# Patient Record
Sex: MALE | Race: BLACK OR AFRICAN AMERICAN | Employment: UNEMPLOYED | URBAN - NONMETROPOLITAN AREA
[De-identification: names, ages, dates, MRNs, and addresses within clinical notes are randomized per-mention and may not be internally consistent; named-entity substitution may affect disease eponyms.]

---

## 2023-06-19 ENCOUNTER — HOSPITAL ENCOUNTER (EMERGENCY)
Age: 28
Discharge: HOME | End: 2023-06-19
Attending: FAMILY MEDICINE

## 2023-06-19 VITALS
TEMPERATURE: 97.1 F | OXYGEN SATURATION: 99 % | HEART RATE: 72 BPM | RESPIRATION RATE: 16 BRPM | WEIGHT: 180 LBS | BODY MASS INDEX: 28.25 KG/M2 | SYSTOLIC BLOOD PRESSURE: 137 MMHG | HEIGHT: 67 IN | DIASTOLIC BLOOD PRESSURE: 97 MMHG

## 2023-06-19 DIAGNOSIS — R30.0 DYSURIA: Primary | ICD-10-CM

## 2023-06-19 LAB
APPEARANCE UR: CLEAR
BACTERIA URNS QL MICRO: ABNORMAL /HPF
BILIRUB UR QL: NEGATIVE
COLOR UR: YELLOW
EPITH CASTS URNS QL MICRO: ABNORMAL /LPF (ref 0–20)
GLUCOSE UR STRIP.AUTO-MCNC: NEGATIVE MG/DL
HGB UR QL STRIP: NEGATIVE
KETONES UR QL STRIP.AUTO: NEGATIVE MG/DL
LEUKOCYTE ESTERASE UR QL STRIP.AUTO: ABNORMAL
NITRITE UR QL STRIP.AUTO: NEGATIVE
PH UR STRIP: 6 (ref 5–8)
PROT UR STRIP-MCNC: NEGATIVE MG/DL
RBC #/AREA URNS HPF: ABNORMAL /HPF (ref 0–2)
SP GR UR REFRACTOMETRY: 1.02 (ref 1–1.03)
UROBILINOGEN UR QL STRIP.AUTO: 1 EU/DL (ref 0.2–1)
WBC URNS QL MICRO: ABNORMAL /HPF (ref 0–4)

## 2023-06-19 PROCEDURE — 99283 EMERGENCY DEPT VISIT LOW MDM: CPT

## 2023-06-19 PROCEDURE — 87491 CHLMYD TRACH DNA AMP PROBE: CPT

## 2023-06-19 PROCEDURE — 87086 URINE CULTURE/COLONY COUNT: CPT

## 2023-06-19 PROCEDURE — 81001 URINALYSIS AUTO W/SCOPE: CPT

## 2023-06-19 PROCEDURE — 87591 N.GONORRHOEAE DNA AMP PROB: CPT

## 2023-06-19 RX ORDER — SULFAMETHOXAZOLE AND TRIMETHOPRIM 800; 160 MG/1; MG/1
1 TABLET ORAL 2 TIMES DAILY
Qty: 6 TABLET | Refills: 0 | Status: SHIPPED | OUTPATIENT
Start: 2023-06-19 | End: 2023-06-22

## 2023-06-19 ASSESSMENT — PAIN SCALES - GENERAL: PAINLEVEL_OUTOF10: 0

## 2023-06-19 ASSESSMENT — PAIN - FUNCTIONAL ASSESSMENT: PAIN_FUNCTIONAL_ASSESSMENT: 0-10

## 2023-06-19 NOTE — ED PROVIDER NOTES
[facility] EMERGENCY DEPT  EMERGENCY DEPARTMENT ENCOUNTER      Pt Name: Johnny Loomis  MRN: [id number]  Xeniagfaj 1995  Date of evaluation: 6/19/2023  Provider: Radha Santos DO    CHIEF COMPLAINT       Chief Complaint   Patient presents with    Sexually Transmitted Diseases    Knee Pain         HISTORY OF PRESENT ILLNESS   (Location/Symptom, Timing/Onset, Context/Setting, Quality, Duration, Modifying Factors, Severity)  Note limiting factors. Johnny Loomis is a 24 y.o. male who presents to the emergency department patient comes in stating on Thursday he had some unprotected sex with a female he does not really know states has been having some burning with urination. He has had chlamydia in the past has not taken anything for it he states this is different than his last STD. He is not sticking to his underwear. Not having any drainage is urinating pain. Denies any anal sex. Denies any knee pain for me. HPI    Nursing Notes were reviewed. REVIEW OF SYSTEMS    (2-9 systems for level 4, 10 or more for level 5)     Review of Systems   Genitourinary:  Positive for dysuria. All other systems reviewed and are negative. Except as noted above the remainder of the review of systems was reviewed and negative. PAST MEDICAL HISTORY   No past medical history on file. SURGICAL HISTORY     No past surgical history on file. CURRENT MEDICATIONS       Previous Medications    No medications on file       ALLERGIES     Patient has no known allergies. FAMILY HISTORY     No family history on file.        SOCIAL HISTORY       Social History     Socioeconomic History    Marital status: Single       SCREENINGS         Mercedes Coma Scale  Eye Opening: Spontaneous  Best Verbal Response: Oriented  Best Motor Response: Obeys commands  Mercedes Coma Scale Score: 15                     CIWA Assessment  BP: (!) 137/97  Pulse: 72                 PHYSICAL EXAM    (up to 7 for level 4, 8 or more for level 3058

## 2023-06-19 NOTE — DISCHARGE INSTRUCTIONS
As we spoke, I will send urine off for culture, your gonorrhea and chlamydia is also been sent off. It sounds more like a urinary tract infection. Pain with urination increased frequency. I do not see any signs of an STD. However if your gonorrhea and chlamydia comes back at positive in the next 24 to 48 hours somebody will call you with appropriate treatment.   My recommendation is to stay in from any intercourse or sexual activity until confirmed that no STD exist

## 2023-06-20 LAB
BACTERIA SPEC CULT: ABNORMAL
COLONY COUNT, CNT: 2000
COLONY COUNT, CNT: ABNORMAL
Lab: ABNORMAL

## 2023-06-20 NOTE — ED NOTES
Pt called requesting information on his lab results.  I informed pt that the result of his STI test has not come back yet and we will call if it comes back positive      Nickolas Anguiano  06/20/23 6820

## 2024-01-10 ENCOUNTER — HOSPITAL ENCOUNTER (OUTPATIENT)
Age: 29
Discharge: HOME | End: 2024-01-13

## 2024-07-08 ENCOUNTER — HOSPITAL ENCOUNTER (EMERGENCY)
Age: 29
Discharge: HOME | End: 2024-07-08
Attending: EMERGENCY MEDICINE
Payer: COMMERCIAL

## 2024-07-08 VITALS
WEIGHT: 160 LBS | DIASTOLIC BLOOD PRESSURE: 79 MMHG | BODY MASS INDEX: 25.11 KG/M2 | HEIGHT: 67 IN | OXYGEN SATURATION: 98 % | TEMPERATURE: 98.4 F | SYSTOLIC BLOOD PRESSURE: 125 MMHG | HEART RATE: 86 BPM | RESPIRATION RATE: 16 BRPM

## 2024-07-08 DIAGNOSIS — T16.1XXA FOREIGN BODY IN RIGHT EAR, INITIAL ENCOUNTER: Primary | ICD-10-CM

## 2024-07-08 PROCEDURE — 99282 EMERGENCY DEPT VISIT SF MDM: CPT

## 2024-07-08 ASSESSMENT — PAIN DESCRIPTION - ORIENTATION: ORIENTATION: RIGHT

## 2024-07-08 ASSESSMENT — PAIN - FUNCTIONAL ASSESSMENT: PAIN_FUNCTIONAL_ASSESSMENT: 0-10

## 2024-07-08 ASSESSMENT — PAIN DESCRIPTION - LOCATION: LOCATION: EAR

## 2024-07-08 ASSESSMENT — PAIN SCALES - GENERAL: PAINLEVEL_OUTOF10: 8

## 2024-07-09 NOTE — ED TRIAGE NOTES
Patient comes in with rubber end of headphone piece stuck in right ear. Patient was working and piece got stuck, attempted to remove with tweezers, but pushed it further in.

## 2024-07-09 NOTE — ED PROVIDER NOTES
[facility] EMERGENCY DEPT  EMERGENCY DEPARTMENT ENCOUNTER    Patient Name: Otoniel Robbins  MRN: [id number]  YOB: 1995  Provider: Albaro Marsh DO  PCP: None, None   Time/Date of evaluation: 9:14 PM EDT on 7/8/24    History of Presenting Illness     History Provided by: Patient  History is limited by: Nothing     HISTORY:   Otoniel Robbins is a 28 y.o. male presents to the urgency department with a chief complaint of foreign body object in the right ear.  Patient states that the rubber tip of his in the ear headphones became stuck in his right ear.  He states that he attempted to remove it with a pair of tweezers but pushed the object further back into his ear.  He reports severe pain which she currently rates 8 out of 10 in intensity.  He denies any fever, chills, headache, sore throat, cough, runny nose, epistaxis, chest pain, or shortness of breath.    Nursing Notes were all reviewed and agreed with or any disagreements were addressed in the HPI.    Past History     PAST MEDICAL HISTORY:  No past medical history on file.    PAST SURGICAL HISTORY:  No past surgical history on file.    FAMILY HISTORY:  No family history on file.    SOCIAL HISTORY:       MEDICATIONS:  No current facility-administered medications for this encounter.     No current outpatient medications on file.       ALLERGIES:  No Known Allergies    SOCIAL DETERMINANTS OF HEALTH:  Social Determinants of Health     Tobacco Use: Not on file   Alcohol Use: Not on file   Financial Resource Strain: Not on file   Food Insecurity: Not on file   Transportation Needs: Not on file   Physical Activity: Not on file   Stress: Not on file   Social Connections: Not on file   Intimate Partner Violence: Not on file   Depression: Not on file   Housing Stability: Not on file   Interpersonal Safety: Not At Risk (6/19/2023)    Interpersonal Safety Domain Source: IP Abuse Screening     Physical abuse: Denies     Verbal abuse: Denies     Emotional

## 2024-12-15 ENCOUNTER — APPOINTMENT (OUTPATIENT)
Age: 29
End: 2024-12-15

## 2024-12-15 ENCOUNTER — HOSPITAL ENCOUNTER (EMERGENCY)
Age: 29
Discharge: HOME | End: 2024-12-15
Attending: EMERGENCY MEDICINE

## 2024-12-15 VITALS
TEMPERATURE: 98.5 F | DIASTOLIC BLOOD PRESSURE: 77 MMHG | WEIGHT: 178 LBS | HEIGHT: 67 IN | SYSTOLIC BLOOD PRESSURE: 127 MMHG | OXYGEN SATURATION: 100 % | RESPIRATION RATE: 16 BRPM | HEART RATE: 84 BPM | BODY MASS INDEX: 27.94 KG/M2

## 2024-12-15 DIAGNOSIS — R10.11 RIGHT UPPER QUADRANT ABDOMINAL PAIN: Primary | ICD-10-CM

## 2024-12-15 LAB
ALBUMIN SERPL-MCNC: 4.3 G/DL (ref 3.4–5)
ALBUMIN/GLOB SERPL: 1.1 (ref 0.8–1.7)
ALP SERPL-CCNC: 44 U/L (ref 45–117)
ALT SERPL-CCNC: 33 U/L (ref 16–61)
ANION GAP SERPL CALC-SCNC: 6 MMOL/L (ref 3–18)
APPEARANCE UR: CLEAR
AST SERPL W P-5'-P-CCNC: 19 U/L (ref 10–38)
BASOPHILS # BLD: 0 K/UL (ref 0–0.1)
BASOPHILS NFR BLD: 1 % (ref 0–2)
BILIRUB SERPL-MCNC: 0.7 MG/DL (ref 0.2–1)
BILIRUB UR QL: NEGATIVE
BUN SERPL-MCNC: 12 MG/DL (ref 7–18)
BUN/CREAT SERPL: 13 (ref 12–20)
CA-I BLD-MCNC: 9.3 MG/DL (ref 8.5–10.1)
CHLORIDE SERPL-SCNC: 105 MMOL/L (ref 100–111)
CO2 SERPL-SCNC: 28 MMOL/L (ref 21–32)
COLOR UR: YELLOW
CREAT SERPL-MCNC: 0.93 MG/DL (ref 0.6–1.3)
DIFFERENTIAL METHOD BLD: ABNORMAL
EOSINOPHIL # BLD: 0 K/UL (ref 0–0.4)
EOSINOPHIL NFR BLD: 1 % (ref 0–5)
ERYTHROCYTE [DISTWIDTH] IN BLOOD BY AUTOMATED COUNT: 12.6 % (ref 11.6–14.5)
GLOBULIN SER CALC-MCNC: 3.9 G/DL (ref 2–4)
GLUCOSE SERPL-MCNC: 92 MG/DL (ref 74–99)
GLUCOSE UR STRIP.AUTO-MCNC: NEGATIVE MG/DL
HCT VFR BLD AUTO: 41.3 % (ref 36–48)
HGB BLD-MCNC: 14.8 G/DL (ref 13–16)
HGB UR QL STRIP: NEGATIVE
IMM GRANULOCYTES # BLD AUTO: 0 K/UL (ref 0–0.04)
IMM GRANULOCYTES NFR BLD AUTO: 0 % (ref 0–0.5)
KETONES UR QL STRIP.AUTO: NEGATIVE MG/DL
LEUKOCYTE ESTERASE UR QL STRIP.AUTO: NEGATIVE
LIPASE SERPL-CCNC: 34 U/L (ref 13–75)
LYMPHOCYTES # BLD: 1.5 K/UL (ref 0.9–3.6)
LYMPHOCYTES NFR BLD: 40 % (ref 21–52)
MCH RBC QN AUTO: 31 PG (ref 24–34)
MCHC RBC AUTO-ENTMCNC: 35.8 G/DL (ref 31–37)
MCV RBC AUTO: 86.4 FL (ref 78–100)
MONOCYTES # BLD: 0.3 K/UL (ref 0.05–1.2)
MONOCYTES NFR BLD: 7 % (ref 3–10)
NEUTS SEG # BLD: 1.9 K/UL (ref 1.8–8)
NEUTS SEG NFR BLD: 51 % (ref 40–73)
NITRITE UR QL STRIP.AUTO: NEGATIVE
NRBC # BLD: 0 K/UL (ref 0–0.01)
NRBC BLD-RTO: 0 PER 100 WBC
PH UR STRIP: 6.5 (ref 5–8)
PLATELET # BLD AUTO: 223 K/UL (ref 135–420)
PMV BLD AUTO: 10.6 FL (ref 9.2–11.8)
POTASSIUM SERPL-SCNC: 3.7 MMOL/L (ref 3.5–5.5)
PROT SERPL-MCNC: 8.2 G/DL (ref 6.4–8.2)
PROT UR STRIP-MCNC: NEGATIVE MG/DL
RBC # BLD AUTO: 4.78 M/UL (ref 4.35–5.65)
SODIUM SERPL-SCNC: 139 MMOL/L (ref 136–145)
SP GR UR REFRACTOMETRY: 1.02 (ref 1–1.03)
UROBILINOGEN UR QL STRIP.AUTO: 1 EU/DL (ref 0.2–1)
WBC # BLD AUTO: 3.7 K/UL (ref 4.6–13.2)

## 2024-12-15 PROCEDURE — 81003 URINALYSIS AUTO W/O SCOPE: CPT

## 2024-12-15 PROCEDURE — 83690 ASSAY OF LIPASE: CPT

## 2024-12-15 PROCEDURE — 80053 COMPREHEN METABOLIC PANEL: CPT

## 2024-12-15 PROCEDURE — 85025 COMPLETE CBC W/AUTO DIFF WBC: CPT

## 2024-12-15 PROCEDURE — 74176 CT ABD & PELVIS W/O CONTRAST: CPT

## 2024-12-15 PROCEDURE — 99284 EMERGENCY DEPT VISIT MOD MDM: CPT

## 2024-12-15 PROCEDURE — 96374 THER/PROPH/DIAG INJ IV PUSH: CPT

## 2024-12-15 PROCEDURE — 6370000000 HC RX 637 (ALT 250 FOR IP): Performed by: EMERGENCY MEDICINE

## 2024-12-15 PROCEDURE — 6360000002 HC RX W HCPCS: Performed by: EMERGENCY MEDICINE

## 2024-12-15 RX ORDER — MAGNESIUM HYDROXIDE/ALUMINUM HYDROXICE/SIMETHICONE 120; 1200; 1200 MG/30ML; MG/30ML; MG/30ML
30 SUSPENSION ORAL
Status: COMPLETED | OUTPATIENT
Start: 2024-12-15 | End: 2024-12-15

## 2024-12-15 RX ORDER — KETOROLAC TROMETHAMINE 10 MG/1
10 TABLET, FILM COATED ORAL EVERY 8 HOURS PRN
Qty: 20 TABLET | Refills: 0 | Status: SHIPPED | OUTPATIENT
Start: 2024-12-15

## 2024-12-15 RX ORDER — KETOROLAC TROMETHAMINE 30 MG/ML
30 INJECTION, SOLUTION INTRAMUSCULAR; INTRAVENOUS
Status: COMPLETED | OUTPATIENT
Start: 2024-12-15 | End: 2024-12-15

## 2024-12-15 RX ORDER — OMEPRAZOLE 40 MG/1
40 CAPSULE, DELAYED RELEASE ORAL
Qty: 30 CAPSULE | Refills: 5 | Status: SHIPPED | OUTPATIENT
Start: 2024-12-15

## 2024-12-15 RX ORDER — DICYCLOMINE HYDROCHLORIDE 10 MG/1
20 CAPSULE ORAL
Status: COMPLETED | OUTPATIENT
Start: 2024-12-15 | End: 2024-12-15

## 2024-12-15 RX ADMIN — DICYCLOMINE HYDROCHLORIDE 20 MG: 10 CAPSULE ORAL at 09:24

## 2024-12-15 RX ADMIN — KETOROLAC TROMETHAMINE 30 MG: 30 INJECTION, SOLUTION INTRAMUSCULAR at 09:26

## 2024-12-15 RX ADMIN — ALUMINUM HYDROXIDE, MAGNESIUM HYDROXIDE, AND SIMETHICONE 30 ML: 200; 200; 20 SUSPENSION ORAL at 09:23

## 2024-12-15 ASSESSMENT — PAIN DESCRIPTION - ORIENTATION: ORIENTATION: RIGHT

## 2024-12-15 ASSESSMENT — PAIN - FUNCTIONAL ASSESSMENT: PAIN_FUNCTIONAL_ASSESSMENT: 0-10

## 2024-12-15 ASSESSMENT — LIFESTYLE VARIABLES
HOW OFTEN DO YOU HAVE A DRINK CONTAINING ALCOHOL: NEVER
HOW MANY STANDARD DRINKS CONTAINING ALCOHOL DO YOU HAVE ON A TYPICAL DAY: PATIENT DOES NOT DRINK

## 2024-12-15 ASSESSMENT — PAIN SCALES - GENERAL: PAINLEVEL_OUTOF10: 6

## 2024-12-15 ASSESSMENT — PAIN DESCRIPTION - LOCATION: LOCATION: FLANK

## 2024-12-15 NOTE — ED TRIAGE NOTES
Has been having right flank pain radiating to his back for the past 3 days, unable to sleep, pain is getting worse

## 2024-12-15 NOTE — ED PROVIDER NOTES
[facility] EMERGENCY DEPT  EMERGENCY DEPARTMENT ENCOUNTER       Pt Name: Otoniel Robbins  MRN: [id number]  Birthdate 1995  Date of evaluation: 12/15/2024  Provider: Marilu Banerjee MD   PCP: None, None  Note Started: 11:18 AM 12/15/24     CHIEF COMPLAINT       Chief Complaint   Patient presents with    Flank Pain        HISTORY OF PRESENT ILLNESS: 1 or more elements      History From: Patient  History limited by: Nothing     Otoniel Robbins is a 28 y.o. male who presents to ED complaining of right flank pain since 3 days ago.  Pain is colicky, intermittent with episodes of exacerbation.  Pain radiates to the back and down right groin.  He reports pain gets worse when he drinks alcohol.  He reports using alcohol for a long time.  He denies history of kidney stones, denies dysuria or penis discharge.  He also denies nausea or vomiting.     Nursing Notes were all reviewed and agreed with or any disagreements were addressed in the HPI.     REVIEW OF SYSTEMS      Review of Systems     Positives and Pertinent negatives as per HPI.    PAST HISTORY     Past Medical History:  History reviewed. No pertinent past medical history.    Past Surgical History:  History reviewed. No pertinent surgical history.    Family History:  History reviewed. No pertinent family history.    Social History:  Social History     Tobacco Use    Smoking status: Never    Smokeless tobacco: Never   Vaping Use    Vaping status: Never Used   Substance Use Topics    Alcohol use: Yes     Comment: social drinker    Drug use: Never       Allergies:  No Known Allergies    CURRENT MEDICATIONS      Previous Medications    No medications on file       SCREENINGS               No data recorded         PHYSICAL EXAM      Vitals:    12/15/24 0823   BP: 121/79   Pulse: 84   Resp: 16   Temp: 98.5 °F (36.9 °C)   TempSrc: Oral   SpO2: 99%   Weight: 80.7 kg (178 lb)   Height: 1.702 m (5' 7\")     Physical Exam    Nursing notes and vital signs

## 2025-08-29 ENCOUNTER — APPOINTMENT (OUTPATIENT)
Age: 30
End: 2025-08-29

## 2025-08-29 ENCOUNTER — HOSPITAL ENCOUNTER (EMERGENCY)
Age: 30
Discharge: HOME | End: 2025-08-29
Attending: FAMILY MEDICINE

## 2025-08-29 VITALS
TEMPERATURE: 97.9 F | WEIGHT: 165 LBS | HEIGHT: 67 IN | OXYGEN SATURATION: 100 % | BODY MASS INDEX: 25.9 KG/M2 | DIASTOLIC BLOOD PRESSURE: 80 MMHG | RESPIRATION RATE: 16 BRPM | HEART RATE: 83 BPM | SYSTOLIC BLOOD PRESSURE: 131 MMHG

## 2025-08-29 DIAGNOSIS — R07.89 ATYPICAL CHEST PAIN: Primary | ICD-10-CM

## 2025-08-29 LAB
ALBUMIN SERPL-MCNC: 4.6 G/DL (ref 3.4–5)
ALBUMIN/GLOB SERPL: 1.6
ALP SERPL-CCNC: 39 U/L (ref 45–117)
ALT SERPL-CCNC: 25 U/L (ref 10–50)
ANION GAP SERPL CALC-SCNC: 13 MMOL/L (ref 3–18)
AST SERPL W P-5'-P-CCNC: 61 U/L (ref 10–38)
BASOPHILS # BLD: 0.02 K/UL (ref 0–0.1)
BASOPHILS NFR BLD: 0.5 % (ref 0–2)
BILIRUB SERPL-MCNC: 1 MG/DL (ref 0.2–1)
BUN SERPL-MCNC: 11 MG/DL (ref 6–23)
BUN/CREAT SERPL: 13
CA-I BLD-MCNC: 9.8 MG/DL (ref 8.5–10.1)
CHLORIDE SERPL-SCNC: 102 MMOL/L (ref 98–107)
CO2 SERPL-SCNC: 25 MMOL/L (ref 21–32)
CREAT SERPL-MCNC: 0.86 MG/DL (ref 0.6–1.3)
D DIMER PPP FEU-MCNC: 0.54 UG/ML(FEU)
DIFFERENTIAL METHOD BLD: ABNORMAL
EOSINOPHIL # BLD: 0.01 K/UL (ref 0–0.4)
EOSINOPHIL NFR BLD: 0.2 % (ref 0–5)
ERYTHROCYTE [DISTWIDTH] IN BLOOD BY AUTOMATED COUNT: 12.4 % (ref 11.6–14.5)
GLOBULIN SER CALC-MCNC: 2.9 G/DL
GLUCOSE SERPL-MCNC: 97 MG/DL (ref 74–108)
HCT VFR BLD AUTO: 37.7 % (ref 36–48)
HGB BLD-MCNC: 13.7 G/DL (ref 13–16)
IMM GRANULOCYTES # BLD AUTO: 0.01 K/UL (ref 0–0.04)
IMM GRANULOCYTES NFR BLD AUTO: 0.2 % (ref 0–0.5)
LYMPHOCYTES # BLD: 1.4 K/UL (ref 0.9–3.6)
LYMPHOCYTES NFR BLD: 32.8 % (ref 21–52)
MCH RBC QN AUTO: 30.4 PG (ref 24–34)
MCHC RBC AUTO-ENTMCNC: 36.3 G/DL (ref 31–37)
MCV RBC AUTO: 83.8 FL (ref 78–100)
MONOCYTES # BLD: 0.36 K/UL (ref 0.05–1.2)
MONOCYTES NFR BLD: 8.4 % (ref 3–10)
NEUTS SEG # BLD: 2.47 K/UL (ref 1.8–8)
NEUTS SEG NFR BLD: 57.9 % (ref 40–73)
NRBC # BLD: 0 K/UL (ref 0–0.01)
NRBC BLD-RTO: 0 PER 100 WBC
PLATELET # BLD AUTO: 220 K/UL (ref 135–420)
PMV BLD AUTO: 10.1 FL (ref 9.2–11.8)
POTASSIUM SERPL-SCNC: 3.2 MMOL/L (ref 3.5–5.5)
PROT SERPL-MCNC: 7.5 G/DL (ref 6.4–8.2)
RBC # BLD AUTO: 4.5 M/UL (ref 4.35–5.65)
SODIUM SERPL-SCNC: 140 MMOL/L (ref 136–145)
TROPONIN T SERPL HS-MCNC: <6 NG/L (ref 0–22)
TROPONIN T SERPL HS-MCNC: <6 NG/L (ref 0–22)
WBC # BLD AUTO: 4.3 K/UL (ref 4.6–13.2)

## 2025-08-29 PROCEDURE — 71045 X-RAY EXAM CHEST 1 VIEW: CPT

## 2025-08-29 PROCEDURE — 6360000004 HC RX CONTRAST MEDICATION: Performed by: FAMILY MEDICINE

## 2025-08-29 PROCEDURE — 85025 COMPLETE CBC W/AUTO DIFF WBC: CPT

## 2025-08-29 PROCEDURE — 99285 EMERGENCY DEPT VISIT HI MDM: CPT

## 2025-08-29 PROCEDURE — 71275 CT ANGIOGRAPHY CHEST: CPT

## 2025-08-29 PROCEDURE — 84484 ASSAY OF TROPONIN QUANT: CPT

## 2025-08-29 PROCEDURE — 80053 COMPREHEN METABOLIC PANEL: CPT

## 2025-08-29 PROCEDURE — 85379 FIBRIN DEGRADATION QUANT: CPT

## 2025-08-29 RX ORDER — IOPAMIDOL 755 MG/ML
100 INJECTION, SOLUTION INTRAVASCULAR
Status: COMPLETED | OUTPATIENT
Start: 2025-08-29 | End: 2025-08-29

## 2025-08-29 RX ADMIN — IOPAMIDOL 100 ML: 755 INJECTION, SOLUTION INTRAVENOUS at 15:14

## 2025-08-29 ASSESSMENT — PAIN DESCRIPTION - DESCRIPTORS: DESCRIPTORS: PRESSURE

## 2025-08-29 ASSESSMENT — PAIN - FUNCTIONAL ASSESSMENT: PAIN_FUNCTIONAL_ASSESSMENT: 0-10

## 2025-08-29 ASSESSMENT — LIFESTYLE VARIABLES
HOW OFTEN DO YOU HAVE A DRINK CONTAINING ALCOHOL: 2-3 TIMES A WEEK
HOW MANY STANDARD DRINKS CONTAINING ALCOHOL DO YOU HAVE ON A TYPICAL DAY: 1 OR 2

## 2025-08-29 ASSESSMENT — PAIN DESCRIPTION - LOCATION: LOCATION: CHEST

## 2025-08-29 ASSESSMENT — PAIN SCALES - GENERAL: PAINLEVEL_OUTOF10: 4

## 2025-08-31 LAB
EKG ATRIAL RATE: 74 BPM
EKG DIAGNOSIS: NORMAL
EKG P AXIS: 53 DEGREES
EKG P-R INTERVAL: 152 MS
EKG Q-T INTERVAL: 344 MS
EKG QRS DURATION: 86 MS
EKG QTC CALCULATION (BAZETT): 381 MS
EKG R AXIS: 26 DEGREES
EKG T AXIS: 32 DEGREES
EKG VENTRICULAR RATE: 74 BPM